# Patient Record
Sex: MALE | Race: WHITE
[De-identification: names, ages, dates, MRNs, and addresses within clinical notes are randomized per-mention and may not be internally consistent; named-entity substitution may affect disease eponyms.]

---

## 2019-01-04 ENCOUNTER — HOSPITAL ENCOUNTER (INPATIENT)
Dept: HOSPITAL 19 - INPTSU | Age: 69
LOS: 3 days | Discharge: NURSING, REHAB OR LONG-TERM CARE | DRG: 713 | End: 2019-01-07
Attending: UROLOGY | Admitting: UROLOGY
Payer: MEDICARE

## 2019-01-04 VITALS — SYSTOLIC BLOOD PRESSURE: 137 MMHG | DIASTOLIC BLOOD PRESSURE: 74 MMHG | HEART RATE: 70 BPM | TEMPERATURE: 97.7 F

## 2019-01-04 VITALS — DIASTOLIC BLOOD PRESSURE: 64 MMHG | HEART RATE: 66 BPM | SYSTOLIC BLOOD PRESSURE: 114 MMHG

## 2019-01-04 VITALS — SYSTOLIC BLOOD PRESSURE: 123 MMHG | DIASTOLIC BLOOD PRESSURE: 62 MMHG | HEART RATE: 58 BPM

## 2019-01-04 VITALS — DIASTOLIC BLOOD PRESSURE: 74 MMHG | TEMPERATURE: 97.7 F | HEART RATE: 70 BPM | SYSTOLIC BLOOD PRESSURE: 137 MMHG

## 2019-01-04 VITALS — TEMPERATURE: 97.8 F | HEART RATE: 58 BPM | SYSTOLIC BLOOD PRESSURE: 142 MMHG | DIASTOLIC BLOOD PRESSURE: 71 MMHG

## 2019-01-04 VITALS — HEART RATE: 88 BPM | TEMPERATURE: 98.6 F | DIASTOLIC BLOOD PRESSURE: 52 MMHG | SYSTOLIC BLOOD PRESSURE: 146 MMHG

## 2019-01-04 VITALS — DIASTOLIC BLOOD PRESSURE: 60 MMHG | TEMPERATURE: 98.2 F | SYSTOLIC BLOOD PRESSURE: 130 MMHG | HEART RATE: 66 BPM

## 2019-01-04 VITALS — HEART RATE: 88 BPM | DIASTOLIC BLOOD PRESSURE: 48 MMHG | SYSTOLIC BLOOD PRESSURE: 103 MMHG

## 2019-01-04 VITALS — HEART RATE: 53 BPM | DIASTOLIC BLOOD PRESSURE: 64 MMHG | SYSTOLIC BLOOD PRESSURE: 127 MMHG

## 2019-01-04 VITALS — HEART RATE: 86 BPM | DIASTOLIC BLOOD PRESSURE: 67 MMHG | SYSTOLIC BLOOD PRESSURE: 107 MMHG

## 2019-01-04 VITALS — HEART RATE: 86 BPM | DIASTOLIC BLOOD PRESSURE: 52 MMHG | SYSTOLIC BLOOD PRESSURE: 146 MMHG

## 2019-01-04 VITALS — BODY MASS INDEX: 28.22 KG/M2 | HEIGHT: 72 IN | WEIGHT: 208.34 LBS

## 2019-01-04 VITALS — HEART RATE: 100 BPM | DIASTOLIC BLOOD PRESSURE: 63 MMHG | SYSTOLIC BLOOD PRESSURE: 116 MMHG

## 2019-01-04 DIAGNOSIS — R39.15: ICD-10-CM

## 2019-01-04 DIAGNOSIS — R33.8: ICD-10-CM

## 2019-01-04 DIAGNOSIS — N32.3: ICD-10-CM

## 2019-01-04 DIAGNOSIS — R35.0: ICD-10-CM

## 2019-01-04 DIAGNOSIS — N13.8: ICD-10-CM

## 2019-01-04 DIAGNOSIS — R39.12: ICD-10-CM

## 2019-01-04 DIAGNOSIS — G80.9: ICD-10-CM

## 2019-01-04 DIAGNOSIS — N40.1: Primary | ICD-10-CM

## 2019-01-04 PROCEDURE — 0VB08ZZ EXCISION OF PROSTATE, VIA NATURAL OR ARTIFICIAL OPENING ENDOSCOPIC: ICD-10-PCS | Performed by: UROLOGY

## 2019-01-04 NOTE — NUR
Patient resting in bed watching television at this time. Patient is alert and
oriented, answers questions appropriately. CBI running at a slow rate, urine
is dark pink and clear. Patient currently denies pain or further needs, call
light within reach.

## 2019-01-04 NOTE — NUR
Patient post op to room 321. His brother at bedside. He is alert & oriented.
He has cerebral Palsey. Cbi to slow rate clear output noted. Scds ble. Vss on
Room air. Water at bedside. denies nausea. Spinal still working-denies pain.
Will vic.

## 2019-01-04 NOTE — NUR
Patient doing well post op. He had dinner and tolerated well. Vitals remain
stable on room air. Cbi remains slow with pink tinged output. Scds ble. Family
at bedside.

## 2019-01-04 NOTE — NUR
TO RM 4 PER OWN WILL ACCOMPANIED WITH BROTHER.
ALERT ORIENTED X3, VERBALIZED UNDERSTANDING AND SIGNED CONSENT.

## 2019-01-05 VITALS — TEMPERATURE: 98.1 F | SYSTOLIC BLOOD PRESSURE: 120 MMHG | HEART RATE: 76 BPM | DIASTOLIC BLOOD PRESSURE: 67 MMHG

## 2019-01-05 VITALS — TEMPERATURE: 98.2 F | SYSTOLIC BLOOD PRESSURE: 136 MMHG | DIASTOLIC BLOOD PRESSURE: 54 MMHG | HEART RATE: 66 BPM

## 2019-01-05 VITALS — HEART RATE: 72 BPM | TEMPERATURE: 98.8 F | SYSTOLIC BLOOD PRESSURE: 114 MMHG | DIASTOLIC BLOOD PRESSURE: 61 MMHG

## 2019-01-05 VITALS — TEMPERATURE: 98.5 F | SYSTOLIC BLOOD PRESSURE: 129 MMHG | HEART RATE: 63 BPM | DIASTOLIC BLOOD PRESSURE: 72 MMHG

## 2019-01-05 VITALS — DIASTOLIC BLOOD PRESSURE: 67 MMHG | SYSTOLIC BLOOD PRESSURE: 115 MMHG | HEART RATE: 88 BPM | TEMPERATURE: 98.5 F

## 2019-01-05 VITALS — SYSTOLIC BLOOD PRESSURE: 122 MMHG | DIASTOLIC BLOOD PRESSURE: 66 MMHG | HEART RATE: 67 BPM | TEMPERATURE: 98 F

## 2019-01-05 NOTE — NUR
Remains sitting up in chair per request. No c/o pain. CBI infusing at moderate
rate. Urine red without clots.

## 2019-01-05 NOTE — NUR
Plan to go to [facility]. Pt was prompted for health details but was
very guarded. Patient reports his niece in [facility] has a DPOA, PCP is Dr. Elizabeth,
has walker and uses PRN. No 02, pt appears slight agitated with questions. No
need identified, SW will continue to follow.

## 2019-01-05 NOTE — NUR
Levsin given for bladder spasms. Bladder irrigated with blood clots returned.
CBI infusing. Hollis draining red urine.

## 2019-01-05 NOTE — NUR
Patient rested intermittently overnight. Moved to recliner during the night
for patient comfort. CBI continues to run at a slow rate, urine remains dark
pink and clear. Patient has continued to deny pain or needs, call light within
reach.

## 2019-01-05 NOTE — NUR
Patient resting in bedside recliner at this time. Patient is alert and
oriented, answers questions appropriately. During rounding, patient reports
that he is "leaking water" upon inspection, it appears that CBI has become
clotted. CBI fluid and urine are leaking from around catheter insertion site.
Patient denies pain or discomfort other than the urge to urinate. Hollis
irrigated and several small clots where removed. CBI is flowing freely again.
Educated patient on when to call for assistance and signs that the CBI may be
clotted. Patient verbalizes understanding. Call light within reach.

## 2019-01-06 VITALS — TEMPERATURE: 97.8 F | HEART RATE: 64 BPM | SYSTOLIC BLOOD PRESSURE: 131 MMHG | DIASTOLIC BLOOD PRESSURE: 75 MMHG

## 2019-01-06 VITALS — SYSTOLIC BLOOD PRESSURE: 116 MMHG | TEMPERATURE: 97.6 F | DIASTOLIC BLOOD PRESSURE: 67 MMHG | HEART RATE: 71 BPM

## 2019-01-06 VITALS — SYSTOLIC BLOOD PRESSURE: 105 MMHG | DIASTOLIC BLOOD PRESSURE: 79 MMHG | TEMPERATURE: 97.6 F | HEART RATE: 69 BPM

## 2019-01-06 VITALS — TEMPERATURE: 97.7 F | SYSTOLIC BLOOD PRESSURE: 122 MMHG | DIASTOLIC BLOOD PRESSURE: 80 MMHG | HEART RATE: 67 BPM

## 2019-01-06 VITALS — TEMPERATURE: 98.8 F | SYSTOLIC BLOOD PRESSURE: 125 MMHG | DIASTOLIC BLOOD PRESSURE: 60 MMHG | HEART RATE: 68 BPM

## 2019-01-06 NOTE — NUR
spoke with Nurse Mars at [facility]
([phone]) in regards to the patient's swing bed status and discharge and
she reported she did not see any medical information pertaining to patient so
 faxed patient's medical information for a swing bed referral
including face sheet, progress notes, medications, admission B forms (no
social history in patient's record/chart) to [facility] 
(Alexandria Lakhani) at [phone]. Madisyn reported that Alexandria is out-of-office
and will return to work on 1/7/19 to process referral information. Social
worker updated patient's Nurse Lia and  will follow as needed
as the [facility] swing bed status is pending acceptance.

## 2019-01-06 NOTE — NUR
Hicks catheter prime and pulled per order. 30cc removed from hicks balloon,
catheter removed intact without incident, patient tolerated procedre well. 200
ml of CBI solution instilled in bladder before catheter removal, no results
immediately upon removal. Patient educated on catching urine for next 6 voids
and informing staff after each void so sample can be collected. Patient
verbalizes understanding. Patient denies pain or further needs at this time,
call light within reach.

## 2019-01-06 NOTE — NUR
Drinking lots of water. Voiding per six bottle routine. Urine has been dark
red with occasional large blood clots. Urine clearing to lighter red.
Continues to void frequently. No c/o pain.

## 2019-01-06 NOTE — NUR
PT IN BED.  UP INDEPENDENTLY TO TOILET.  PT VOIDING.  URINE STILL RED TINGED.
OCCASIONAL CLOTS IN URINE.

## 2019-01-07 VITALS — TEMPERATURE: 98 F | SYSTOLIC BLOOD PRESSURE: 124 MMHG | HEART RATE: 69 BPM | DIASTOLIC BLOOD PRESSURE: 75 MMHG

## 2019-01-07 VITALS — DIASTOLIC BLOOD PRESSURE: 61 MMHG | TEMPERATURE: 98.2 F | SYSTOLIC BLOOD PRESSURE: 128 MMHG | HEART RATE: 69 BPM

## 2019-01-07 VITALS — SYSTOLIC BLOOD PRESSURE: 128 MMHG | HEART RATE: 69 BPM | TEMPERATURE: 98.2 F | DIASTOLIC BLOOD PRESSURE: 61 MMHG

## 2019-01-07 VITALS — HEART RATE: 74 BPM | DIASTOLIC BLOOD PRESSURE: 60 MMHG | TEMPERATURE: 98.7 F | SYSTOLIC BLOOD PRESSURE: 128 MMHG

## 2019-01-07 NOTE — NUR
Discharge instructions reviewed with patient and family, verbalized
understanding.  Discharged via wheelchair to [facility] with family.
Report called to Alexia at [facility], paperwork sent.

## 2019-01-07 NOTE — NUR
Patient alert and oriented, answers questions appropriately.  See assessment.
Patient voiding dark ilya urine, no clots noted.  Scrotom enlarged from
hernia.  No c/o pain or discomfort.

## 2019-01-07 NOTE — NUR
Worker from [facility] came to the hosptial at 11:30 to complete a level two
care assessment with patient.  After she informed nurse that patient would not
be placed in a nursing home today so patient will DC to [facility] for
swing bed services until placement has been approved.  SANJEEV talked with SANJEEV Yang at [facility], to inform her of this.  SANJEEV faxed discharge and patients
brother will be doing the transportation there.

## 2019-01-07 NOTE — NUR
SANJEEV spoke with Sheron at [facility].  Patient was there on swing bed prior
to coming here for surgery.  Patient is accepted back as they are waiting on
[facility] to come and do a level two care assessment due to patients Cerebral
Palsy.  After he gets that completed the plan is for him to go to [facility] in [place].  SANJEEV informed nurse of this.  Brother is in room to
transport once orders are in.